# Patient Record
Sex: MALE | Race: WHITE | NOT HISPANIC OR LATINO | Employment: OTHER | URBAN - METROPOLITAN AREA
[De-identification: names, ages, dates, MRNs, and addresses within clinical notes are randomized per-mention and may not be internally consistent; named-entity substitution may affect disease eponyms.]

---

## 2024-02-08 ENCOUNTER — OFFICE VISIT (OUTPATIENT)
Dept: CARDIOLOGY | Facility: CLINIC | Age: 64
End: 2024-02-08
Payer: COMMERCIAL

## 2024-02-08 VITALS
BODY MASS INDEX: 34.59 KG/M2 | HEART RATE: 65 BPM | SYSTOLIC BLOOD PRESSURE: 130 MMHG | WEIGHT: 261 LBS | DIASTOLIC BLOOD PRESSURE: 82 MMHG | HEIGHT: 73 IN

## 2024-02-08 DIAGNOSIS — Z95.5 S/P RIGHT CORONARY ARTERY (RCA) STENT PLACEMENT: ICD-10-CM

## 2024-02-08 DIAGNOSIS — I25.10 CORONARY ARTERY DISEASE INVOLVING NATIVE CORONARY ARTERY OF NATIVE HEART WITHOUT ANGINA PECTORIS: Primary | ICD-10-CM

## 2024-02-08 DIAGNOSIS — R07.2 PRECORDIAL PAIN: ICD-10-CM

## 2024-02-08 DIAGNOSIS — I10 PRIMARY HYPERTENSION: ICD-10-CM

## 2024-02-08 DIAGNOSIS — E78.00 PURE HYPERCHOLESTEROLEMIA: ICD-10-CM

## 2024-02-08 PROCEDURE — 93000 ELECTROCARDIOGRAM COMPLETE: CPT | Performed by: INTERNAL MEDICINE

## 2024-02-08 PROCEDURE — 99204 OFFICE O/P NEW MOD 45 MIN: CPT | Performed by: INTERNAL MEDICINE

## 2024-02-08 RX ORDER — LISINOPRIL AND HYDROCHLOROTHIAZIDE 20; 12.5 MG/1; MG/1
1 TABLET ORAL DAILY
COMMUNITY
Start: 2023-11-08

## 2024-02-08 RX ORDER — METOPROLOL SUCCINATE 25 MG/1
12.5 TABLET, EXTENDED RELEASE ORAL DAILY
COMMUNITY
Start: 2023-10-19 | End: 2024-10-18

## 2024-02-08 RX ORDER — TESTOSTERONE CYPIONATE 200 MG/ML
200 INJECTION, SOLUTION INTRAMUSCULAR
COMMUNITY
Start: 2024-01-08

## 2024-02-08 RX ORDER — ASPIRIN 81 MG/1
81 TABLET ORAL DAILY
COMMUNITY

## 2024-02-08 RX ORDER — ALFUZOSIN HYDROCHLORIDE 10 MG/1
10 TABLET, EXTENDED RELEASE ORAL DAILY
COMMUNITY

## 2024-02-08 RX ORDER — ATORVASTATIN CALCIUM 40 MG/1
40 TABLET, FILM COATED ORAL NIGHTLY
Qty: 90 TABLET | Refills: 3 | Status: SHIPPED | COMMUNITY
Start: 2024-02-08 | End: 2024-02-12 | Stop reason: SDUPTHER

## 2024-02-08 RX ORDER — ATORVASTATIN CALCIUM 20 MG/1
20 TABLET, FILM COATED ORAL DAILY
COMMUNITY
End: 2024-02-08

## 2024-02-08 NOTE — PROGRESS NOTES
Date of Office Visit: 2024  Encounter Provider: Ángela Fontanez MD  Place of Service: [facility] CARDIOLOGY  Patient Name: Yossi Vee  :1960      Patient ID:  Yossi Vee is a 63 y.o. male is here for CAD.           History of Present Illness    He has a history of CAD-s/p RCA and circumflex stents , history of syncope, hypertension, hyperlipidemia, obesity.    He quit smoking .  His mom had hypertension, diabetes mellitus, colon and kidney cancer, CABG in her 60s.  His dad had strokes, hypertension and diabetes.  His brother and sister have hypertension.  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    He was having exertional chest heaviness.  He had treadmill stress study with ST changes in the inferior leads.  Cardiac catheterization done 2019 showed a 20% distal left main stenosis, 70% distal LAD stenosis, 80% proximal circumflex stenosis, 95% proximal RCA stenosis.  He received a 3 x 15 AMANDA in the proximal RCA, 3 x 15 AMANDA in the mid RCA, 2.5 x 18 AMANDA in the proximal left circumflex into a large second OM branch.    Labs on 10/17/2023 show normal CBC, normal CMP, total cholesterol 139, triglycerides 151, HDL 29, LDL 80, normal TSH.    He has had 2 weeks of sharp stabbing left chest pain.  He does not feels heart racing or skipping.  His energy level stable.  He has no dyspnea, orthopnea or PND.  He has had no syncope or falls.  He takes his medications as directed without difficulty.  His energy level is stable.  He has a lot of back injuries and has cramping in his legs likely due to that.    Past Medical History:   Diagnosis Date    CAD (coronary artery disease)     Cystitis cystica     DDD (degenerative disc disease), cervical     History of nephrectomy     History of vasectomy     HLD (hyperlipidemia)     HTN (hypertension)     Kidney stone     Low testosterone     S/P  gastrointestinal surgery     S/P tendon repair     HIP         Past Surgical History:   Procedure Laterality Date    APPENDECTOMY      CATARACT EXTRACTION      CERVICAL DISC ARTHROPLASTY      CORONARY STENT PLACEMENT      x3    LUMBAR FUSION      SINUS SURGERY      TONSILLECTOMY      URETHRA SURGERY         Current Outpatient Medications on File Prior to Visit   Medication Sig Dispense Refill    alfuzosin (UROXATRAL) 10 MG 24 hr tablet Take 1 tablet by mouth Daily.      aspirin 81 MG EC tablet Take 1 tablet by mouth Daily.      atorvastatin (LIPITOR) 20 MG tablet Take 1 tablet by mouth Daily.      lisinopril-hydrochlorothiazide (PRINZIDE,ZESTORETIC) 20-12.5 MG per tablet Take 1 tablet by mouth Daily.      metoprolol succinate XL (TOPROL-XL) 25 MG 24 hr tablet Take 0.5 tablets by mouth Daily.      Testosterone Cypionate (DEPOTESTOTERONE CYPIONATE) 200 MG/ML injection Inject 1 mL into the appropriate muscle as directed by prescriber.       No current facility-administered medications on file prior to visit.       Social History     Socioeconomic History    Marital status:     Number of children: 2    Years of education: 12    Highest education level: High school graduate   Tobacco Use    Smoking status: Former     Types: Cigarettes     Quit date:      Years since quittin.1     Passive exposure: Past    Smokeless tobacco: Never    Tobacco comments:     Caffeine: 2 coffee daily   Vaping Use    Vaping Use: Never used   Substance and Sexual Activity    Alcohol use: Not Currently    Drug use: Never    Sexual activity: Defer             Procedures    ECG 12 Lead    Date/Time: 2024 1:30 PM  Performed by: Ángela Fontanez MD    Authorized by: Ángela Fontanez MD  Comparison: not compared with previous ECG   Previous ECG: no previous ECG available  Rhythm: sinus rhythm  Other findings: left ventricular hypertrophy    Clinical impression: abnormal EKG              Objective:      Vitals:     "02/08/24 1320   BP: 130/82   Pulse: 65   Weight: 118 kg (261 lb)   Height: 185.4 cm (73\")     Body mass index is 34.43 kg/m².    Vitals reviewed.   Constitutional:       General: Not in acute distress.     Appearance: Not diaphoretic.   Neck:      Vascular: No carotid bruit or JVD.   Pulmonary:      Effort: Pulmonary effort is normal.      Breath sounds: Normal breath sounds.   Cardiovascular:      Normal rate. Regular rhythm.      No gallop.    Pulses:     Intact distal pulses.      Carotid: 2+ bilaterally.     Radial: 2+ bilaterally.     Dorsalis pedis: 2+ bilaterally.     Posterior tibial: 2+ bilaterally.  Edema:     Peripheral edema absent.   Neurological:      Cranial Nerves: No cranial nerve deficit.           Lab Review:       Assessment:      Diagnosis Plan   1. Coronary artery disease involving native coronary artery of native heart without angina pectoris  Adult Transthoracic Echo Complete W/ Cont if Necessary Per Protocol    Stress Test With Myocardial Perfusion One Day      2. Primary hypertension        3. Pure hypercholesterolemia        4. S/P right coronary artery (RCA) stent placement  Adult Transthoracic Echo Complete W/ Cont if Necessary Per Protocol    Stress Test With Myocardial Perfusion One Day      5. Precordial pain  Adult Transthoracic Echo Complete W/ Cont if Necessary Per Protocol    Stress Test With Myocardial Perfusion One Day        CAD with history of proximal-mid RCA stents, proximal left circumflex stent.  Now having intermittent left-sided chest pain, substrate syncope fusion site and echocardiogram.  Hypertension, well-controlled.  Continue metoprolol and lisinopril hydrochlorothiazide.  Hyperlipidemia, increase atorvastatin to 40 mg nightly.  Back injuries     Plan:       See me in September, set up stress and echocardiogram.  Recommended vascular screening.  Increase atorvastatin as noted above.  "

## 2024-02-12 RX ORDER — ATORVASTATIN CALCIUM 40 MG/1
40 TABLET, FILM COATED ORAL NIGHTLY
Qty: 90 TABLET | Refills: 3 | Status: SHIPPED | OUTPATIENT
Start: 2024-02-12

## 2024-03-19 ENCOUNTER — TELEPHONE (OUTPATIENT)
Dept: CARDIOLOGY | Facility: CLINIC | Age: 64
End: 2024-03-19
Payer: COMMERCIAL

## 2024-03-19 ENCOUNTER — HOSPITAL ENCOUNTER (OUTPATIENT)
Dept: CARDIOLOGY | Facility: HOSPITAL | Age: 64
Discharge: HOME | End: 2024-03-19
Admitting: INTERNAL MEDICINE

## 2024-03-19 VITALS
HEART RATE: 59 BPM | SYSTOLIC BLOOD PRESSURE: 123 MMHG | WEIGHT: 261 LBS | HEIGHT: 72 IN | DIASTOLIC BLOOD PRESSURE: 79 MMHG | BODY MASS INDEX: 35.35 KG/M2

## 2024-03-19 DIAGNOSIS — E78.00 PURE HYPERCHOLESTEROLEMIA: ICD-10-CM

## 2024-03-19 DIAGNOSIS — I10 PRIMARY HYPERTENSION: ICD-10-CM

## 2024-03-19 DIAGNOSIS — I25.10 CORONARY ARTERY DISEASE INVOLVING NATIVE CORONARY ARTERY OF NATIVE HEART WITHOUT ANGINA PECTORIS: ICD-10-CM

## 2024-03-19 LAB
BH CV ECHO MEAS - DIST AO DIAM: 1.44 CM
BH CV VAS BP LEFT ARM: NORMAL MMHG
BH CV VAS BP RIGHT ARM: NORMAL MMHG
BH CV VAS SCREENING CAROTID CCA LEFT: NORMAL CM/SEC
BH CV VAS SCREENING CAROTID CCA RIGHT: NORMAL CM/SEC
BH CV VAS SCREENING CAROTID ICA LEFT: NORMAL CM/SEC
BH CV VAS SCREENING CAROTID ICA RIGHT: NORMAL CM/SEC
BH CV XLRA MEAS - MID AO DIAM: 1.6 CM
BH CV XLRA MEAS - PAD LEFT ABI DP: 1.14
BH CV XLRA MEAS - PAD LEFT ABI PT: 1.17
BH CV XLRA MEAS - PAD LEFT ARM: 123 MMHG
BH CV XLRA MEAS - PAD LEFT LEG DP: 141 MMHG
BH CV XLRA MEAS - PAD LEFT LEG PT: 145 MMHG
BH CV XLRA MEAS - PAD RIGHT ABI DP: 1.09
BH CV XLRA MEAS - PAD RIGHT ABI PT: 1.21
BH CV XLRA MEAS - PAD RIGHT ARM: 120 MMHG
BH CV XLRA MEAS - PAD RIGHT LEG DP: 134 MMHG
BH CV XLRA MEAS - PAD RIGHT LEG PT: 149 MMHG
BH CV XLRA MEAS - PROX AO DIAM: 1.73 CM
BH CV XLRA MEAS LEFT ICA/CCA RATIO: 1.03
BH CV XLRA MEAS LEFT PROX ECA PSV: NORMAL CM/SEC
BH CV XLRA MEAS RIGHT ICA/CCA RATIO: 1.16
BH CV XLRA MEAS RIGHT PROX ECA PSV: NORMAL CM/SEC
MAXIMAL PREDICTED HEART RATE: 156 BPM
STRESS TARGET HR: 133 BPM

## 2024-03-19 PROCEDURE — 93799 UNLISTED CV SVC/PROCEDURE: CPT

## 2024-03-19 NOTE — TELEPHONE ENCOUNTER
I spoke with Yossi Vee and updated pt on results from provider.  They verbalized understanding and have no further questions at this time.    Thank you,    Celi GASPAR, RN  [place] [facility]  03/19/24 15:53 EDT

## 2024-05-13 ENCOUNTER — TELEPHONE (OUTPATIENT)
Dept: CARDIOLOGY | Facility: CLINIC | Age: 64
End: 2024-05-13
Payer: COMMERCIAL

## 2024-05-14 ENCOUNTER — TELEPHONE (OUTPATIENT)
Dept: CARDIOLOGY | Facility: CLINIC | Age: 64
End: 2024-05-14

## 2024-05-14 ENCOUNTER — HOSPITAL ENCOUNTER (OUTPATIENT)
Dept: CARDIOLOGY | Facility: HOSPITAL | Age: 64
Discharge: HOME | End: 2024-05-14
Payer: COMMERCIAL

## 2024-05-14 VITALS
HEIGHT: 72 IN | DIASTOLIC BLOOD PRESSURE: 85 MMHG | WEIGHT: 261 LBS | SYSTOLIC BLOOD PRESSURE: 131 MMHG | BODY MASS INDEX: 35.35 KG/M2 | HEART RATE: 52 BPM

## 2024-05-14 VITALS — BODY MASS INDEX: 51.07 KG/M2 | HEIGHT: 60 IN | WEIGHT: 260.14 LBS

## 2024-05-14 DIAGNOSIS — R07.2 PRECORDIAL PAIN: ICD-10-CM

## 2024-05-14 DIAGNOSIS — I25.10 CORONARY ARTERY DISEASE INVOLVING NATIVE CORONARY ARTERY OF NATIVE HEART WITHOUT ANGINA PECTORIS: ICD-10-CM

## 2024-05-14 DIAGNOSIS — Z95.5 S/P RIGHT CORONARY ARTERY (RCA) STENT PLACEMENT: ICD-10-CM

## 2024-05-14 LAB
AORTIC ARCH: 2.8 CM
ASCENDING AORTA: 3.6 CM
BH CV ECHO MEAS - ACS: 2.05 CM
BH CV ECHO MEAS - AO MAX PG: 9.5 MMHG
BH CV ECHO MEAS - AO MEAN PG: 5 MMHG
BH CV ECHO MEAS - AO ROOT DIAM: 3.8 CM
BH CV ECHO MEAS - AO V2 MAX: 154 CM/SEC
BH CV ECHO MEAS - AO V2 VTI: 35.8 CM
BH CV ECHO MEAS - AVA(I,D): 2.19 CM2
BH CV ECHO MEAS - EDV(CUBED): 140.6 ML
BH CV ECHO MEAS - EDV(MOD-SP2): 84 ML
BH CV ECHO MEAS - EDV(MOD-SP4): 147 ML
BH CV ECHO MEAS - EF(MOD-BP): 59 %
BH CV ECHO MEAS - EF(MOD-SP2): 58.3 %
BH CV ECHO MEAS - EF(MOD-SP4): 59.2 %
BH CV ECHO MEAS - ESV(CUBED): 51.7 ML
BH CV ECHO MEAS - ESV(MOD-SP2): 35 ML
BH CV ECHO MEAS - ESV(MOD-SP4): 60 ML
BH CV ECHO MEAS - FS: 28.4 %
BH CV ECHO MEAS - IVS/LVPW: 1 CM
BH CV ECHO MEAS - IVSD: 0.9 CM
BH CV ECHO MEAS - LAT PEAK E' VEL: 10.6 CM/SEC
BH CV ECHO MEAS - LV DIASTOLIC VOL/BSA (35-75): 61.6 CM2
BH CV ECHO MEAS - LV MASS(C)D: 169 GRAMS
BH CV ECHO MEAS - LV MAX PG: 4.5 MMHG
BH CV ECHO MEAS - LV MEAN PG: 2 MMHG
BH CV ECHO MEAS - LV SYSTOLIC VOL/BSA (12-30): 25.2 CM2
BH CV ECHO MEAS - LV V1 MAX: 106 CM/SEC
BH CV ECHO MEAS - LV V1 VTI: 22.5 CM
BH CV ECHO MEAS - LVIDD: 5.2 CM
BH CV ECHO MEAS - LVIDS: 3.7 CM
BH CV ECHO MEAS - LVOT AREA: 3.5 CM2
BH CV ECHO MEAS - LVOT DIAM: 2.11 CM
BH CV ECHO MEAS - LVPWD: 0.9 CM
BH CV ECHO MEAS - MED PEAK E' VEL: 10.1 CM/SEC
BH CV ECHO MEAS - MR MAX PG: 41.7 MMHG
BH CV ECHO MEAS - MR MAX VEL: 323.1 CM/SEC
BH CV ECHO MEAS - MV A DUR: 0.13 SEC
BH CV ECHO MEAS - MV A MAX VEL: 73.7 CM/SEC
BH CV ECHO MEAS - MV DEC SLOPE: 263.4 CM/SEC2
BH CV ECHO MEAS - MV DEC TIME: 0.17 SEC
BH CV ECHO MEAS - MV E MAX VEL: 85.3 CM/SEC
BH CV ECHO MEAS - MV E/A: 1.16
BH CV ECHO MEAS - MV MAX PG: 2.18 MMHG
BH CV ECHO MEAS - MV MEAN PG: 0.66 MMHG
BH CV ECHO MEAS - MV P1/2T: 82.1 MSEC
BH CV ECHO MEAS - MV V2 VTI: 31.7 CM
BH CV ECHO MEAS - MVA(P1/2T): 2.7 CM2
BH CV ECHO MEAS - MVA(VTI): 2.47 CM2
BH CV ECHO MEAS - PA ACC TIME: 0.14 SEC
BH CV ECHO MEAS - PA V2 MAX: 103.9 CM/SEC
BH CV ECHO MEAS - PI END-D VEL: 119.5 CM/SEC
BH CV ECHO MEAS - PULM A REVS DUR: 0.11 SEC
BH CV ECHO MEAS - PULM A REVS VEL: 25.2 CM/SEC
BH CV ECHO MEAS - PULM DIAS VEL: 39 CM/SEC
BH CV ECHO MEAS - PULM S/D: 1.39
BH CV ECHO MEAS - PULM SYS VEL: 54.2 CM/SEC
BH CV ECHO MEAS - QP/QS: 0.8
BH CV ECHO MEAS - RAP SYSTOLE: 8 MMHG
BH CV ECHO MEAS - RV MAX PG: 2.1 MMHG
BH CV ECHO MEAS - RV V1 MAX: 72.4 CM/SEC
BH CV ECHO MEAS - RV V1 VTI: 15.4 CM
BH CV ECHO MEAS - RVOT DIAM: 2.28 CM
BH CV ECHO MEAS - RVSP: 13 MMHG
BH CV ECHO MEAS - SUP REN AO DIAM: 2.3 CM
BH CV ECHO MEAS - SV(LVOT): 78.5 ML
BH CV ECHO MEAS - SV(MOD-SP2): 49 ML
BH CV ECHO MEAS - SV(MOD-SP4): 87 ML
BH CV ECHO MEAS - SV(RVOT): 62.7 ML
BH CV ECHO MEAS - SVI(LVOT): 32.9 ML/M2
BH CV ECHO MEAS - SVI(MOD-SP2): 20.5 ML/M2
BH CV ECHO MEAS - SVI(MOD-SP4): 36.5 ML/M2
BH CV ECHO MEAS - TAPSE (>1.6): 2.28 CM
BH CV ECHO MEAS - TR MAX PG: 4.8 MMHG
BH CV ECHO MEAS - TR MAX VEL: 110 CM/SEC
BH CV ECHO MEASUREMENTS AVERAGE E/E' RATIO: 8.24
BH CV NUCLEAR PRIOR STUDY: 2
BH CV REST NUCLEAR ISOTOPE DOSE: 11.8 MCI
BH CV STRESS BP STAGE 1: NORMAL
BH CV STRESS BP STAGE 2: NORMAL
BH CV STRESS DURATION MIN STAGE 1: 3
BH CV STRESS DURATION MIN STAGE 2: 3
BH CV STRESS DURATION MIN STAGE 3: 1
BH CV STRESS DURATION SEC STAGE 1: 0
BH CV STRESS DURATION SEC STAGE 2: 0
BH CV STRESS DURATION SEC STAGE 3: 30
BH CV STRESS GRADE STAGE 1: 10
BH CV STRESS GRADE STAGE 2: 12
BH CV STRESS GRADE STAGE 3: 14
BH CV STRESS HR STAGE 1: 95
BH CV STRESS HR STAGE 2: 115
BH CV STRESS HR STAGE 3: 143
BH CV STRESS METS STAGE 1: 5
BH CV STRESS METS STAGE 2: 7.5
BH CV STRESS METS STAGE 3: 9
BH CV STRESS NUCLEAR ISOTOPE DOSE: 35.5 MCI
BH CV STRESS PROTOCOL 1: NORMAL
BH CV STRESS RECOVERY BP: NORMAL MMHG
BH CV STRESS RECOVERY HR: 82 BPM
BH CV STRESS SPEED STAGE 1: 1.7
BH CV STRESS SPEED STAGE 2: 2.5
BH CV STRESS SPEED STAGE 3: 3.4
BH CV STRESS STAGE 1: 1
BH CV STRESS STAGE 2: 2
BH CV STRESS STAGE 3: 3
BH CV XLRA - RV BASE: 3.3 CM
BH CV XLRA - RV LENGTH: 9.3 CM
BH CV XLRA - RV MID: 3.1 CM
BH CV XLRA - TDI S': 13.6 CM/SEC
LEFT ATRIUM VOLUME INDEX: 33.4 ML/M2
LV EF NUC BP: 51 %
MAXIMAL PREDICTED HEART RATE: 155 BPM
PERCENT MAX PREDICTED HR: 92.26 %
SINUS: 3.8 CM
STJ: 2.7 CM
STRESS BASELINE BP: NORMAL MMHG
STRESS BASELINE HR: 60 BPM
STRESS PERCENT HR: 109 %
STRESS POST ESTIMATED WORKLOAD: 9 METS
STRESS POST EXERCISE DUR MIN: 7 MIN
STRESS POST EXERCISE DUR SEC: 30 SEC
STRESS POST PEAK BP: NORMAL MMHG
STRESS POST PEAK HR: 143 BPM
STRESS TARGET HR: 132 BPM

## 2024-05-14 PROCEDURE — 78452 HT MUSCLE IMAGE SPECT MULT: CPT

## 2024-05-14 PROCEDURE — 78452 HT MUSCLE IMAGE SPECT MULT: CPT | Performed by: INTERNAL MEDICINE

## 2024-05-14 PROCEDURE — 93018 CV STRESS TEST I&R ONLY: CPT | Performed by: INTERNAL MEDICINE

## 2024-05-14 PROCEDURE — 93016 CV STRESS TEST SUPVJ ONLY: CPT | Performed by: INTERNAL MEDICINE

## 2024-05-14 PROCEDURE — 93017 CV STRESS TEST TRACING ONLY: CPT

## 2024-05-14 PROCEDURE — A9502 TC99M TETROFOSMIN: HCPCS | Performed by: INTERNAL MEDICINE

## 2024-05-14 PROCEDURE — 0 TECHNETIUM TETROFOSMIN KIT: Performed by: INTERNAL MEDICINE

## 2024-05-14 PROCEDURE — 93306 TTE W/DOPPLER COMPLETE: CPT | Performed by: INTERNAL MEDICINE

## 2024-05-14 PROCEDURE — 93306 TTE W/DOPPLER COMPLETE: CPT

## 2024-05-14 RX ADMIN — TETROFOSMIN 1 DOSE: 1.38 INJECTION, POWDER, LYOPHILIZED, FOR SOLUTION INTRAVENOUS at 12:15

## 2024-05-14 RX ADMIN — TETROFOSMIN 1 DOSE: 1.38 INJECTION, POWDER, LYOPHILIZED, FOR SOLUTION INTRAVENOUS at 11:02

## 2024-05-14 NOTE — TELEPHONE ENCOUNTER
Notified patient of results. Patient verbalized understanding.    Aracely Lau RN  Triage [facility] [place]

## 2024-05-14 NOTE — TELEPHONE ENCOUNTER
Pt called back in to triage.  Results reviewed with pt.  Instructed to call with any further questions or concerns.  Verbalized understanding.    Fouzia Dove RN  Triage Nurse, [facility] [place]  05/14/24 13:16 EDT

## 2024-05-14 NOTE — TELEPHONE ENCOUNTER
Called and left VM. Will continue to try to reach patient. HUB transfer call to triage.     Aracely Lau RN  [place] [facility]

## 2024-09-05 ENCOUNTER — OFFICE VISIT (OUTPATIENT)
Dept: CARDIOLOGY | Facility: CLINIC | Age: 64
End: 2024-09-05
Payer: MEDICARE

## 2024-09-05 VITALS
HEIGHT: 72 IN | SYSTOLIC BLOOD PRESSURE: 178 MMHG | BODY MASS INDEX: 36.03 KG/M2 | HEART RATE: 56 BPM | WEIGHT: 266 LBS | DIASTOLIC BLOOD PRESSURE: 100 MMHG

## 2024-09-05 DIAGNOSIS — E78.00 PURE HYPERCHOLESTEROLEMIA: ICD-10-CM

## 2024-09-05 DIAGNOSIS — Z95.5 S/P RIGHT CORONARY ARTERY (RCA) STENT PLACEMENT: ICD-10-CM

## 2024-09-05 DIAGNOSIS — I25.10 CORONARY ARTERY DISEASE INVOLVING NATIVE CORONARY ARTERY OF NATIVE HEART WITHOUT ANGINA PECTORIS: Primary | ICD-10-CM

## 2024-09-05 DIAGNOSIS — I10 PRIMARY HYPERTENSION: ICD-10-CM

## 2024-09-05 PROCEDURE — 1160F RVW MEDS BY RX/DR IN RCRD: CPT | Performed by: INTERNAL MEDICINE

## 2024-09-05 PROCEDURE — 93000 ELECTROCARDIOGRAM COMPLETE: CPT | Performed by: INTERNAL MEDICINE

## 2024-09-05 PROCEDURE — 3080F DIAST BP >= 90 MM HG: CPT | Performed by: INTERNAL MEDICINE

## 2024-09-05 PROCEDURE — 99214 OFFICE O/P EST MOD 30 MIN: CPT | Performed by: INTERNAL MEDICINE

## 2024-09-05 PROCEDURE — 1159F MED LIST DOCD IN RCRD: CPT | Performed by: INTERNAL MEDICINE

## 2024-09-05 PROCEDURE — 3077F SYST BP >= 140 MM HG: CPT | Performed by: INTERNAL MEDICINE

## 2024-09-05 RX ORDER — AMLODIPINE BESYLATE 5 MG/1
5 TABLET ORAL NIGHTLY
Qty: 90 TABLET | Refills: 3 | Status: SHIPPED | OUTPATIENT
Start: 2024-09-05

## 2024-09-05 RX ORDER — MAGNESIUM OXIDE 400 MG/1
400 TABLET ORAL DAILY
Qty: 90 TABLET | Refills: 3 | Status: SHIPPED | OUTPATIENT
Start: 2024-09-05

## 2024-09-05 RX ORDER — LISINOPRIL 20 MG/1
20 TABLET ORAL DAILY
Qty: 90 TABLET | Refills: 3 | Status: SHIPPED | OUTPATIENT
Start: 2024-09-05

## 2024-09-05 NOTE — PROGRESS NOTES
Date of Office Visit: 2024  Encounter Provider: Ángela Fontanez MD  Place of Service: [facility] CARDIOLOGY  Patient Name: Yossi Vee  :1960      Patient ID:  Yossi Vee is a 64 y.o. male is here for CAD.           History of Present Illness    He has a history of CAD-s/p RCA and circumflex stents , history of syncope, hypertension, hyperlipidemia, obesity.    He quit smoking .  His mom had hypertension, diabetes mellitus, colon and kidney cancer, CABG in her 60s.  His dad had strokes, hypertension and diabetes.  His brother and sister have hypertension.  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    He was having exertional chest heaviness.  He had treadmill stress study with ST changes in the inferior leads.  Cardiac catheterization done 2019 showed a 20% distal left main stenosis, 70% distal LAD stenosis, 80% proximal circumflex stenosis, 95% proximal RCA stenosis.  He received a 3 x 15 AMANDA in the proximal RCA, 3 x 15 AMANDA in the mid RCA, 2.5 x 18 AMANDA in the proximal left circumflex into a large second OM branch.    He had vascular screening done 3/19/2024 which showed carotid plaquing but no stenosis, otherwise normal vascular screening.  He had a nonischemic stress nuclear perfusion study on 2024.  He had an echocardiogram done 2024 showing ejection action 59% with mild left atrial enlargement, aortic root 3.8 cm, normal RVSP, normal diastolic function.    He is getting muscle cramping.  He works very hard outside and sweats a lot.  He is drinking only water and almost no pop.  His breathing is good.  He does not feels heart racing or skipping and has had no dizziness or syncope.  He has no exertional chest sinus or pressure.  He has no exertional dyspnea.  He has no orthopnea or PND.    Past Medical History:   Diagnosis Date    CAD (coronary artery disease)      Cystitis cystica     DDD (degenerative disc disease), cervical     History of nephrectomy     History of vasectomy     HLD (hyperlipidemia)     HTN (hypertension)     Kidney stone     Low testosterone     S/P gastrointestinal surgery     S/P tendon repair     HIP         Past Surgical History:   Procedure Laterality Date    APPENDECTOMY      CATARACT EXTRACTION      CERVICAL DISC ARTHROPLASTY      CORONARY STENT PLACEMENT      x3    LUMBAR FUSION      SINUS SURGERY      TONSILLECTOMY      URETHRA SURGERY         Current Outpatient Medications on File Prior to Visit   Medication Sig Dispense Refill    alfuzosin (UROXATRAL) 10 MG 24 hr tablet Take 1 tablet by mouth Daily.      aspirin 81 MG EC tablet Take 1 tablet by mouth Daily.      atorvastatin (LIPITOR) 40 MG tablet Take 1 tablet by mouth Every Night. 90 tablet 3    Cholecalciferol 125 MCG (5000 UT) tablet       lisinopril-hydrochlorothiazide (PRINZIDE,ZESTORETIC) 20-12.5 MG per tablet Take 1 tablet by mouth Daily.      metoprolol succinate XL (TOPROL-XL) 25 MG 24 hr tablet Take 0.5 tablets by mouth Daily.      Testosterone Cypionate (DEPOTESTOTERONE CYPIONATE) 200 MG/ML injection Inject 1 mL into the appropriate muscle as directed by prescriber.       No current facility-administered medications on file prior to visit.       Social History     Socioeconomic History    Marital status:     Number of children: 2    Years of education: 12    Highest education level: High school graduate   Tobacco Use    Smoking status: Former     Current packs/day: 0.00     Types: Cigarettes     Quit date:      Years since quittin.7     Passive exposure: Past    Smokeless tobacco: Never    Tobacco comments:     Caffeine: 2 coffee daily   Vaping Use    Vaping status: Never Used   Substance and Sexual Activity    Alcohol use: Not Currently    Drug use: Never    Sexual activity: Defer             Procedures    ECG 12 Lead    Date/Time: 2024 1:18 PM  Performed by:  "Ángela Fontanez MD    Authorized by: Ángela Fontanez MD  Comparison: compared with previous ECG   Similar to previous ECG  Rhythm: sinus rhythm  QRS axis: left    Clinical impression: abnormal EKG            Objective:      Vitals:    09/05/24 1259   BP: 178/100   Pulse: 56   Weight: 121 kg (266 lb)   Height: 182.9 cm (72\")     Body mass index is 36.08 kg/m².    Vitals reviewed.   Constitutional:       General: Not in acute distress.     Appearance: Not diaphoretic.   Neck:      Vascular: No carotid bruit or JVD.   Pulmonary:      Effort: Pulmonary effort is normal.      Breath sounds: Normal breath sounds.   Cardiovascular:      Normal rate. Regular rhythm.      No gallop.    Pulses:     Intact distal pulses.      Carotid: 2+ bilaterally.     Radial: 2+ bilaterally.     Dorsalis pedis: 2+ bilaterally.     Posterior tibial: 2+ bilaterally.  Edema:     Peripheral edema absent.   Neurological:      Cranial Nerves: No cranial nerve deficit.         Lab Review:       Assessment:      Diagnosis Plan   1. Coronary artery disease involving native coronary artery of native heart without angina pectoris        2. S/P right coronary artery (RCA) stent placement        3. Primary hypertension        4. Pure hypercholesterolemia          CAD with history of proximal-mid RCA stents, proximal left circumflex stent.  Now having intermittent left-sided chest pain, substrate syncope fusion site and echocardiogram.  Hypertension, BP high today.  Continue metoprolol.  Medication changes as noted below.  Hyperlipidemia, on atorvastatin 40 mg nightly.  Back injuries     Plan:       See Edith in 6 months.  I think he is getting a lot of muscle cramping due to electrolyte abnormalities and relative dehydration.  Start magnesium oxide 400 mg daily, stop HCTZ.  Restart lisinopril 20 mg daily and start amlodipine 5 mg at night for hypertension.  No other testing at this time.  Will get labs from PCP.  "

## 2024-12-05 RX ORDER — METOPROLOL SUCCINATE 25 MG/1
12.5 TABLET, EXTENDED RELEASE ORAL DAILY
Qty: 45 TABLET | Refills: 3 | Status: SHIPPED | OUTPATIENT
Start: 2024-12-05

## 2024-12-05 NOTE — TELEPHONE ENCOUNTER
Patient is requesting a refill on Metoprolol    NOV-03/12/25-EE  LOV-09/05/24-RM    CAD with history of proximal-mid RCA stents, proximal left circumflex stent.  Now having intermittent left-sided chest pain, substrate syncope fusion site and echocardiogram.  Hypertension, BP high today.  Continue metoprolol.  Medication changes as noted below.  Hyperlipidemia, on atorvastatin 40 mg nightly.  Back injuries     Plan:       See Edith in 6 months.  I think he is getting a lot of muscle cramping due to electrolyte abnormalities and relative dehydration.  Start magnesium oxide 400 mg daily, stop HCTZ.  Restart lisinopril 20 mg daily and start amlodipine 5 mg at night for hypertension.  No other testing at this time.  Will get labs from PCP.

## 2024-12-20 RX ORDER — ATORVASTATIN CALCIUM 40 MG/1
40 TABLET, FILM COATED ORAL NIGHTLY
Qty: 90 TABLET | Refills: 3 | Status: SHIPPED | OUTPATIENT
Start: 2024-12-20

## 2024-12-20 NOTE — TELEPHONE ENCOUNTER
Failed protocol    Nov-03/12/25-EE  LOV-09/05/24-RM    Plan:       See Edith in 6 months.  I think he is getting a lot of muscle cramping due to electrolyte abnormalities and relative dehydration.  Start magnesium oxide 400 mg daily, stop HCTZ.  Restart lisinopril 20 mg daily and start amlodipine 5 mg at night for hypertension.  No other testing at this time.  Will get labs from PCP.

## 2025-02-06 ENCOUNTER — OFFICE VISIT (OUTPATIENT)
Dept: NEUROSURGERY | Facility: CLINIC | Age: 65
End: 2025-02-06
Payer: MEDICARE

## 2025-02-06 ENCOUNTER — TELEPHONE (OUTPATIENT)
Dept: NEUROSURGERY | Facility: CLINIC | Age: 65
End: 2025-02-06

## 2025-02-06 VITALS
SYSTOLIC BLOOD PRESSURE: 120 MMHG | TEMPERATURE: 97.3 F | HEIGHT: 73 IN | DIASTOLIC BLOOD PRESSURE: 80 MMHG | BODY MASS INDEX: 34.87 KG/M2 | WEIGHT: 263.1 LBS

## 2025-02-06 DIAGNOSIS — I73.00 RAYNAUD'S PHENOMENON WITHOUT GANGRENE: ICD-10-CM

## 2025-02-06 DIAGNOSIS — M47.812 CERVICAL SPONDYLOSIS WITHOUT MYELOPATHY: Primary | ICD-10-CM

## 2025-02-06 DIAGNOSIS — M50.121 CERVICAL DISC DISORDER AT C4-C5 LEVEL WITH RADICULOPATHY: ICD-10-CM

## 2025-02-06 PROCEDURE — 1160F RVW MEDS BY RX/DR IN RCRD: CPT | Performed by: PHYSICIAN ASSISTANT

## 2025-02-06 PROCEDURE — 99204 OFFICE O/P NEW MOD 45 MIN: CPT | Performed by: PHYSICIAN ASSISTANT

## 2025-02-06 PROCEDURE — 1159F MED LIST DOCD IN RCRD: CPT | Performed by: PHYSICIAN ASSISTANT

## 2025-02-06 PROCEDURE — 3074F SYST BP LT 130 MM HG: CPT | Performed by: PHYSICIAN ASSISTANT

## 2025-02-06 PROCEDURE — 3079F DIAST BP 80-89 MM HG: CPT | Performed by: PHYSICIAN ASSISTANT

## 2025-02-06 NOTE — PROGRESS NOTES
Patient: Yossi Vee  : 1960  Chart #: [id number]    Date of Service: 2025    CC: Neck left shoulder left arm pain      HPI  Mr. Vee is a 64-year-old gentleman who previously had a two-level artificial disc placed at C5-6 and C6-7 in 2018 in [facility].  The neurosurgeon there also did a lumbar L4-5 fusion.  He has since moved to [place] and lives in [place] farm.  He presents to our office with left-sided neck pain radiating into the top of the shoulder and then down the arm usually just to the elbow.  He has had symptoms for over a year however his pain is gotten worse and he has weakness in his deltoids and has difficulty raising his left arm above shoulder level.  He was sent for x-rays of his cervical spine showing his interbody prosthesis at C5-6 and C6-7 and x-rays of his left shoulder showing mild glenohumeral joint degenerative changes.    Chronic Illnesses:    Past Medical History:   Diagnosis Date    CAD (coronary artery disease)     Cystitis cystica     DDD (degenerative disc disease), cervical     History of nephrectomy     History of vasectomy     HLD (hyperlipidemia)     HTN (hypertension)     Kidney stone     Low testosterone     S/P gastrointestinal surgery     S/P tendon repair     HIP         Past Surgical History:   Procedure Laterality Date    APPENDECTOMY      CATARACT EXTRACTION      CERVICAL DISC ARTHROPLASTY      CORONARY STENT PLACEMENT      x3    LUMBAR FUSION      SINUS SURGERY      TONSILLECTOMY      URETHRA SURGERY         Allergies   Allergen Reactions    Codeine Nausea And Vomiting         Current Outpatient Medications:     alfuzosin (UROXATRAL) 10 MG 24 hr tablet, Take 1 tablet by mouth Daily., Disp: , Rfl:     amLODIPine (NORVASC) 5 MG tablet, Take 1 tablet by mouth Every Night., Disp: 90 tablet, Rfl: 3    aspirin 81 MG EC tablet, Take 1 tablet by mouth Daily., Disp: , Rfl:     atorvastatin (LIPITOR) 40 MG tablet, TAKE 1 TABLET BY MOUTH  EVERY NIGHT, Disp: 90 tablet, Rfl: 3    lisinopril (PRINIVIL,ZESTRIL) 20 MG tablet, Take 1 tablet by mouth Daily., Disp: 90 tablet, Rfl: 3    metoprolol succinate XL (TOPROL-XL) 25 MG 24 hr tablet, Take 0.5 tablets by mouth Daily., Disp: 45 tablet, Rfl: 3    Testosterone Cypionate (DEPOTESTOTERONE CYPIONATE) 200 MG/ML injection, Inject 1 mL into the appropriate muscle as directed by prescriber., Disp: , Rfl:     vitamin D3 125 MCG (5000 UT) capsule capsule, Take 1 capsule by mouth Daily., Disp: , Rfl:     magnesium oxide (MAG-OX) 400 MG tablet, Take 1 tablet by mouth Daily., Disp: 90 tablet, Rfl: 3    metFORMIN (GLUCOPHAGE) 500 MG tablet, TAKE 1 TABLET BY MOUTH AT NOON, Disp: , Rfl:     Social History     Socioeconomic History    Marital status:     Number of children: 2    Years of education: 12    Highest education level: High school graduate   Tobacco Use    Smoking status: Former     Current packs/day: 0.00     Types: Cigarettes     Quit date:      Years since quittin.1     Passive exposure: Past    Smokeless tobacco: Never    Tobacco comments:     Caffeine: 2 coffee daily   Vaping Use    Vaping status: Never Used   Substance and Sexual Activity    Alcohol use: Not Currently    Drug use: Never    Sexual activity: Defer       Family History   Problem Relation Age of Onset    Diabetes Mother     Hypertension Mother     Heart failure Mother     Heart disease Mother     Colon cancer Mother     Diabetes Father     Hypertension Father     Stroke Father     Hypertension Sister     Hypertension Brother     Diabetes Brother        Class 2 Severe Obesity (BMI >=35 and <=39.9). Obesity-related health conditions include the following: osteoarthritis. Obesity is improving with lifestyle modifications. BMI is is above average; BMI management plan is completed.     Social History    Tobacco Use      Smoking status: Former        Packs/day: 0.00        Types: Cigarettes        Quit date:         Years since  "quittin.1        Passive exposure: Past      Smokeless tobacco: Never      Tobacco comments: Caffeine: 2 coffee daily       Review of Systems   Patient reports he had frostbite of the toes on his right foot 3 to 4 weeks ago with the big snow and ice.  He has a long history of his feet being cold.  Long history of pinpricks to the feet.  He has noticed his skin discoloration of red, white and blue.    Gait & Balance Assessment:  Risk assessment for falls. Fall precautions:  such as;   Using gait aids a cane, walker at the appropriate height at all times for ambulation or if necessary a wheelchair  Removing all area rugs and coffee tables to create a safe environment at home  Ensure clean, dry floors  Wearing supportive footwear and properly fitting clothing  Ensure bed/chair is appropriate height and patient's feet can touch the floor  Using a shower transfer bench  Using walk-in shower and having shower safety bars installed  Ensure proper lighting, minimize glare  Have nightlights operational and in use  Participation in an exercise program for gait training, balance training and strength  Avoid carrying laundry up and down steps  Ensure proper compliance and organization of medications to avoid errors   Avoid use of over the counter sedatives and alcohol consumption  Ensure easy access to call bell, glasses, TV control, telephone  Ensure glasses/hearing aids are in use or close by (on top of night table)     Physical examination:  Blood pressure 120/80, temperature 97.3 °F (36.3 °C), temperature source Infrared, height 185.4 cm (73\"), weight 119 kg (263 lb 1.6 oz).  HEENT- normocephalic, atraumatic, sclera clear  Lungs-normal expansion, no wheezing  Heart-regular rate and rhythm  Extremities-positive pulses, no edema in the upper and lower extremities.  He has color changes in the toes, red-white and purple.  His feet are cold to the touch.    Neurological Exam   WDWN WM  A/A/C, speech clear, attention " normal, conversant, answers questions appropriately, good historian.  Cranial nerves II through XII are intact.  Motor examination does not reveal weakness in the . upper extremity examination is 4+/5.  Mild quad weakness in the left leg.  Sensation feels like sharp tingling pain in the medial aspect of the right foot.  Gait is normal, balance is normal.   No tremors are noted.  Reflexes are intact in the lower extremities.  Hard to elicit in the upper extremities  Mann is negative. Clonus is negative.   Palpation of the left shoulder is tender, there is glenohumeral tenderness.  He has painful range of motion of the left shoulder.    Radiographic Imaging:  For my review x-rays of the cervical spine which show interbody devices at C5-6 and C6-7.    Medical Decision Making  Diagnoses and all orders for this visit:    1. Cervical spondylosis without myelopathy (Primary)  -     MRI Cervical Spine With & Without Contrast; Future  -     XR spine cervical ap and lat w flex and ext; Future  -     CT Cervical Spine Without Contrast; Future  -     EMG & Nerve Conduction Test; Future    2. Cervical disc disorder at C4-C5 level with radiculopathy  -     MRI Cervical Spine With & Without Contrast; Future  -     XR spine cervical ap and lat w flex and ext; Future  -     CT Cervical Spine Without Contrast; Future  -     EMG & Nerve Conduction Test; Future    3. Raynaud's phenomenon without gangrene  -     EMG & Nerve Conduction Test; Future    Mr. Vee is a 64-year-old gentleman with prior lumbar fusion and two-level cervical fusion that was done in [facility].  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  He has been having left-sided neck shoulder and arm pain for several months and referred to neurosurgery for evaluation.  The patient requires new studies to evaluate for nerve root compression.   Patient gives symptoms associated with  Raynaud's into his feet and toes.  I would ask him to discuss this with his PCP regarding treatment.     Based upon the findings we will determine our plan of treatment.  The patient and his wife are in agreement with this plan.    Any copied data from previous notes included in the (1) HPI, (2) PE, (3) MDM and/or assessment and plan has been reviewed and is accurate as of this day.    Chanell Peres, REJI    Patient Care Team:  Trino Rodriguez DO as PCP - General (Family Medicine)

## 2025-03-26 ENCOUNTER — OFFICE VISIT (OUTPATIENT)
Dept: CARDIOLOGY | Age: 65
End: 2025-03-26
Payer: MEDICARE

## 2025-03-26 VITALS
HEART RATE: 53 BPM | BODY MASS INDEX: 35.39 KG/M2 | DIASTOLIC BLOOD PRESSURE: 70 MMHG | HEIGHT: 73 IN | OXYGEN SATURATION: 98 % | WEIGHT: 267 LBS | SYSTOLIC BLOOD PRESSURE: 125 MMHG

## 2025-03-26 DIAGNOSIS — E78.00 PURE HYPERCHOLESTEROLEMIA: ICD-10-CM

## 2025-03-26 DIAGNOSIS — I10 PRIMARY HYPERTENSION: ICD-10-CM

## 2025-03-26 DIAGNOSIS — I25.10 CORONARY ARTERY DISEASE INVOLVING NATIVE CORONARY ARTERY OF NATIVE HEART WITHOUT ANGINA PECTORIS: Primary | ICD-10-CM

## 2025-03-26 NOTE — PROGRESS NOTES
Date of Office Visit: 2025  Encounter Provider: LIZ Sharp  Place of Service: [facility] CARDIOLOGY  Established cardiologist: Ángela Fontanez MD  Patient Name: Yossi Vee  :1960      Patient ID:  Yossi Vee is a 64 y.o. male is here for  followup    With a pertinent medical history of CAD-s/p RCA and circumflex stents , history of syncope, hypertension, hyperlipidemia, obesity.     He quit smoking . His mom had hypertension, diabetes mellitus, colon and kidney cancer, CABG in her 60s. His dad had strokes, hypertension and diabetes. His brother and sister have hypertension. 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     History of Present Illness   he is experiencing exertional chest heaviness.  Treadmill stress with inferior ST segment abnormalities.  Cardiac catheterization 19 to 20% distal left main stenosis, 70% distal LAD stenosis, 80% proximal circumflex stenosis, 95% proximal RCA stenosis.  He received AMANDA to the proximal RCA, mid RCA, proximal circumflex.     Vascular screening 3/19/2024 with carotid plaque, but no stenosis otherwise normal.  Completed nonischemic stress nuclear study 2024.  An echocardiogram at this time an ejection fraction of 59%, sclerotic aortic valve, trace mitral regurgitation, trace tricuspid regurgitation borderline dilated aortic root measuring 3.8 cm.     He last saw Dr. Fontanez in the office 2024.  BP elevated.  He was having frequent muscle cramping and it was felt this was due to electrolyte abnormalities and dehydration and he was to start magnesium oxide 400 mg daily and stop HCTZ.  Lisinopril 20 mg daily was added as well as amlodipine 5 mg nightly.    3/21/2025 he completed WILLIAM measurements at [facility] which showed no evidence of significant arterial insufficiency in the lower extremities.  Bilateral  arterial lower extremity Dopplers showed no high-grade stenosis or occlusion.    Today Yossi is here with his wife.  He is feeling well.  He actually suffered frostbite this winter when he was out too long in the snow and had some damage to his right toes that he is following PCP for.  Otherwise he has no acute complaints or concerns today.  He enjoys going out and hunting.  His muscle cramping resolved with magnesium supplementation.  He has not had any chest pain or pressure, shortness of breath, PARADA, PND, presyncope/syncope, heart racing, palpitations, lightheadedness, dizziness, or edema.      Current Outpatient Medications on File Prior to Visit   Medication Sig Dispense Refill    alfuzosin (UROXATRAL) 10 MG 24 hr tablet Take 1 tablet by mouth Daily.      amLODIPine (NORVASC) 5 MG tablet Take 1 tablet by mouth Every Night. 90 tablet 3    aspirin 81 MG EC tablet Take 1 tablet by mouth Daily.      atorvastatin (LIPITOR) 40 MG tablet TAKE 1 TABLET BY MOUTH EVERY NIGHT 90 tablet 3    lisinopril (PRINIVIL,ZESTRIL) 20 MG tablet Take 1 tablet by mouth Daily. 90 tablet 3    magnesium oxide (MAG-OX) 400 MG tablet Take 1 tablet by mouth Daily. 90 tablet 3    metFORMIN (GLUCOPHAGE) 500 MG tablet TAKE 1 TABLET BY MOUTH AT NOON      metoprolol succinate XL (TOPROL-XL) 25 MG 24 hr tablet Take 0.5 tablets by mouth Daily. 45 tablet 3    Testosterone Cypionate (DEPOTESTOTERONE CYPIONATE) 200 MG/ML injection Inject 1 mL into the appropriate muscle as directed by prescriber.      vitamin D3 125 MCG (5000 UT) capsule capsule Take 1 capsule by mouth Daily.       No current facility-administered medications on file prior to visit.         Procedures    ECG 12 Lead    Date/Time: 3/26/2025 10:14 AM  Performed by: Edith Mccoy APRN    Authorized by: Edith Mccoy APRN  Comparison: compared with previous ECG from 2/8/2024  Comparison to previous ECG: Inferolateral ST-T abnormalities.  Rhythm: sinus rhythm  BPM: 53            "   Objective:      Vitals:    03/26/25 0948   BP: 125/70   Pulse: 53   SpO2: 98%   Weight: 121 kg (267 lb)   Height: 185.4 cm (73\")     Body mass index is 35.23 kg/m².  Wt Readings from Last 3 Encounters:   03/26/25 121 kg (267 lb)   02/06/25 119 kg (263 lb 1.6 oz)   09/05/24 121 kg (266 lb)         Constitutional:       Appearance: Healthy appearance. Not in distress.   Eyes:      Pupils: Pupils are equal, round, and reactive to light.   Neck:      Vascular: No JVD.   Pulmonary:      Effort: Pulmonary effort is normal.      Breath sounds: Normal breath sounds.   Cardiovascular:      Normal rate. Regular rhythm.      Murmurs: There is no murmur.   Pulses:     Popliteal: 2+ bilaterally.     Posterior tibial: 2+ bilaterally.  Edema:     Peripheral edema absent.   Musculoskeletal:      Cervical back: Normal range of motion. Skin:     General: Skin is warm and dry.   Neurological:      Mental Status: Alert, oriented to person, place, and time and oriented to person, place and time.   Psychiatric:         Speech: Speech normal.       Lab Review:   1/8/2025, labs done at [facility] unremarkable CBC.  Creatinine 1.10 otherwise unremarkable CMP.  Total cholesterol 120  HDL 29 LDL 70.  TSH 0.93    Assessment:     1. Coronary artery disease involving native coronary artery of native heart without angina pectoris    2. Primary hypertension    3. Pure hypercholesterolemia        CAD with history of proximal-mid RCA stents, proximal left circumflex stent.  Nonischemic stress 5/2024.  He has no angina.  He continues on ASA, statin, beta-blocker.   Hypertension, this is well-controlled on present regimen.  Amlodipine 5 mg, lisinopril 20 mg, metoprolol succinate XL 25 mg.  Hyperlipidemia, on atorvastatin 40 mg nightly.  Back injuries, has had various discectomies and spine procedures.     Plan:   No medication changes were made  He stable from a cardiac standpoint  He will see Dr. Fontanez in 1 year        Thank you for " allowing me to participate in this patient's care. Please call with any questions or concerns.          Dragon dictation device was utilized in this note.

## 2025-04-21 ENCOUNTER — HOSPITAL ENCOUNTER (OUTPATIENT)
Dept: MRI IMAGING | Facility: HOSPITAL | Age: 65
Discharge: HOME | End: 2025-04-21
Payer: MEDICARE

## 2025-04-21 ENCOUNTER — OFFICE VISIT (OUTPATIENT)
Dept: NEUROSURGERY | Facility: CLINIC | Age: 65
End: 2025-04-21
Payer: MEDICARE

## 2025-04-21 ENCOUNTER — HOSPITAL ENCOUNTER (OUTPATIENT)
Dept: GENERAL RADIOLOGY | Facility: HOSPITAL | Age: 65
Discharge: HOME | End: 2025-04-21
Payer: MEDICARE

## 2025-04-21 ENCOUNTER — HOSPITAL ENCOUNTER (OUTPATIENT)
Dept: NEUROLOGY | Facility: HOSPITAL | Age: 65
Discharge: HOME | End: 2025-04-21
Payer: MEDICARE

## 2025-04-21 ENCOUNTER — HOSPITAL ENCOUNTER (OUTPATIENT)
Dept: CT IMAGING | Facility: HOSPITAL | Age: 65
Discharge: HOME | End: 2025-04-21
Payer: MEDICARE

## 2025-04-21 VITALS — TEMPERATURE: 98.2 F | BODY MASS INDEX: 36.22 KG/M2 | HEIGHT: 72 IN | WEIGHT: 267.4 LBS

## 2025-04-21 DIAGNOSIS — R29.898 WEAKNESS OF LEFT SHOULDER: Primary | ICD-10-CM

## 2025-04-21 DIAGNOSIS — M50.121 CERVICAL DISC DISORDER AT C4-C5 LEVEL WITH RADICULOPATHY: ICD-10-CM

## 2025-04-21 DIAGNOSIS — M47.812 CERVICAL SPONDYLOSIS WITHOUT MYELOPATHY: ICD-10-CM

## 2025-04-21 DIAGNOSIS — M25.512 PAIN IN JOINT OF LEFT SHOULDER: ICD-10-CM

## 2025-04-21 DIAGNOSIS — I73.00 RAYNAUD'S PHENOMENON WITHOUT GANGRENE: ICD-10-CM

## 2025-04-21 PROCEDURE — A9577 INJ MULTIHANCE: HCPCS | Performed by: PHYSICIAN ASSISTANT

## 2025-04-21 PROCEDURE — 95886 MUSC TEST DONE W/N TEST COMP: CPT

## 2025-04-21 PROCEDURE — 72125 CT NECK SPINE W/O DYE: CPT

## 2025-04-21 PROCEDURE — 25510000002 GADOBENATE DIMEGLUMINE 529 MG/ML SOLUTION: Performed by: PHYSICIAN ASSISTANT

## 2025-04-21 PROCEDURE — 72050 X-RAY EXAM NECK SPINE 4/5VWS: CPT

## 2025-04-21 PROCEDURE — 1159F MED LIST DOCD IN RCRD: CPT | Performed by: PHYSICIAN ASSISTANT

## 2025-04-21 PROCEDURE — 72156 MRI NECK SPINE W/O & W/DYE: CPT

## 2025-04-21 PROCEDURE — 99214 OFFICE O/P EST MOD 30 MIN: CPT | Performed by: PHYSICIAN ASSISTANT

## 2025-04-21 PROCEDURE — 1160F RVW MEDS BY RX/DR IN RCRD: CPT | Performed by: PHYSICIAN ASSISTANT

## 2025-04-21 PROCEDURE — 95911 NRV CNDJ TEST 9-10 STUDIES: CPT

## 2025-04-21 RX ORDER — LISINOPRIL AND HYDROCHLOROTHIAZIDE 12.5; 2 MG/1; MG/1
1 TABLET ORAL DAILY
COMMUNITY
End: 2025-04-21

## 2025-04-21 RX ADMIN — GADOBENATE DIMEGLUMINE 20 ML: 529 INJECTION, SOLUTION INTRAVENOUS at 07:17

## 2025-04-21 NOTE — PROGRESS NOTES
Patient: Yossi Vee  : 1960  Chart #: [id number]    Date of Service: 2025    CC: Neck left shoulder left arm pain      HPI  Mr. Vee is a 64-year-old gentleman who previously had a two-level artificial disc placed at C5-6 and C6-7 in 2018 in [facility].  The neurosurgeon there also did a lumbar L4-5 fusion.  He has since moved to [place] and lives in [place] farm.  He presents to our office with left-sided neck pain radiating into the top of the shoulder and then down the arm usually just to the elbow.  He has had symptoms for over a year however his pain is gotten worse and he has weakness in his deltoids and has difficulty raising his left arm above shoulder level.  He was sent for x-rays of his cervical spine showing his interbody prosthesis at C5-6 and C6-7 and x-rays of his left shoulder showing mild glenohumeral joint degenerative changes.    Chronic Illnesses:    Past Medical History:   Diagnosis Date    CAD (coronary artery disease)     Cystitis cystica     DDD (degenerative disc disease), cervical     History of nephrectomy     History of vasectomy     HLD (hyperlipidemia)     HTN (hypertension)     Kidney stone     Low testosterone     S/P gastrointestinal surgery     S/P tendon repair     HIP         Past Surgical History:   Procedure Laterality Date    APPENDECTOMY      CATARACT EXTRACTION      CERVICAL DISC ARTHROPLASTY      CORONARY STENT PLACEMENT      x3    LUMBAR FUSION      SINUS SURGERY      TONSILLECTOMY      URETHRA SURGERY         Allergies   Allergen Reactions    Codeine Nausea And Vomiting         Current Outpatient Medications:     alfuzosin (UROXATRAL) 10 MG 24 hr tablet, Take 1 tablet by mouth Daily., Disp: , Rfl:     amLODIPine (NORVASC) 5 MG tablet, Take 1 tablet by mouth Every Night., Disp: 90 tablet, Rfl: 3    aspirin 81 MG EC tablet, Take 1 tablet by mouth Daily., Disp: , Rfl:     atorvastatin (LIPITOR) 40 MG tablet, TAKE 1 TABLET BY MOUTH  EVERY NIGHT, Disp: 90 tablet, Rfl: 3    lisinopril (PRINIVIL,ZESTRIL) 20 MG tablet, Take 1 tablet by mouth Daily., Disp: 90 tablet, Rfl: 3    magnesium oxide (MAG-OX) 400 MG tablet, Take 1 tablet by mouth Daily., Disp: 90 tablet, Rfl: 3    metoprolol succinate XL (TOPROL-XL) 25 MG 24 hr tablet, Take 0.5 tablets by mouth Daily., Disp: 45 tablet, Rfl: 3    Testosterone Cypionate (DEPOTESTOTERONE CYPIONATE) 200 MG/ML injection, Inject 1 mL into the appropriate muscle as directed by prescriber., Disp: , Rfl:     vitamin D3 125 MCG (5000 UT) capsule capsule, Take 1 capsule by mouth Daily., Disp: , Rfl:     lisinopril-hydrochlorothiazide (PRINZIDE,ZESTORETIC) 20-12.5 MG per tablet, Take 1 tablet by mouth Daily. (Patient not taking: Reported on 2025), Disp: , Rfl:     metFORMIN (GLUCOPHAGE) 500 MG tablet, TAKE 1 TABLET BY MOUTH AT NOON (Patient not taking: Reported on 2025), Disp: , Rfl:   No current facility-administered medications for this visit.    Social History     Socioeconomic History    Marital status:     Number of children: 2    Years of education: 12    Highest education level: High school graduate   Tobacco Use    Smoking status: Former     Current packs/day: 0.00     Types: Cigarettes     Quit date:      Years since quittin.3     Passive exposure: Past    Smokeless tobacco: Never    Tobacco comments:     Caffeine: 2 coffee daily   Vaping Use    Vaping status: Never Used   Substance and Sexual Activity    Alcohol use: Not Currently    Drug use: Never    Sexual activity: Defer       Family History   Problem Relation Age of Onset    Diabetes Mother     Hypertension Mother     Heart failure Mother     Heart disease Mother     Colon cancer Mother     Diabetes Father     Hypertension Father     Stroke Father     Hypertension Sister     Hypertension Brother     Diabetes Brother        Class 2 Severe Obesity (BMI >=35 and <=39.9). Obesity-related health conditions include the following:  osteoarthritis. Obesity is improving with lifestyle modifications. BMI is is above average; BMI management plan is completed.     Social History    Tobacco Use      Smoking status: Former        Packs/day: 0.00        Types: Cigarettes        Quit date:         Years since quittin.3        Passive exposure: Past      Smokeless tobacco: Never      Tobacco comments: Caffeine: 2 coffee daily       Review of Systems   Constitutional:  Negative for activity change, appetite change, chills, diaphoresis, fatigue, fever and unexpected weight change.   HENT:  Negative for congestion, dental problem, drooling, ear discharge, ear pain, facial swelling, hearing loss, mouth sores, nosebleeds, postnasal drip, rhinorrhea, sinus pressure, sinus pain, sneezing, sore throat, tinnitus, trouble swallowing and voice change.    Eyes:  Negative for photophobia, pain, discharge, redness, itching and visual disturbance.   Respiratory:  Negative for apnea, cough, choking, chest tightness, shortness of breath, wheezing and stridor.    Cardiovascular:  Negative for chest pain, palpitations and leg swelling.   Gastrointestinal:  Negative for abdominal distention, abdominal pain, anal bleeding, blood in stool, constipation, diarrhea, nausea, rectal pain and vomiting.   Endocrine: Negative for cold intolerance, heat intolerance, polydipsia, polyphagia and polyuria.   Genitourinary:  Negative for decreased urine volume, difficulty urinating, dysuria, enuresis, flank pain, frequency, genital sores, hematuria, penile discharge, penile pain, penile swelling, scrotal swelling, testicular pain and urgency.   Musculoskeletal:  Positive for arthralgias. Negative for back pain, gait problem, joint swelling, myalgias, neck pain and neck stiffness.        RT shoulder    Skin:  Negative for color change, pallor, rash and wound.   Allergic/Immunologic: Negative for environmental allergies, food allergies and immunocompromised state.   Neurological:   "Positive for numbness. Negative for dizziness, tremors, seizures, syncope, facial asymmetry, speech difficulty, weakness, light-headedness and headaches.   Hematological:  Negative for adenopathy. Does not bruise/bleed easily.   Psychiatric/Behavioral:  Negative for agitation, behavioral problems, confusion, decreased concentration, dysphoric mood, hallucinations, self-injury, sleep disturbance and suicidal ideas. The patient is not nervous/anxious and is not hyperactive.    All other systems reviewed and are negative.     Patient reports he had frostbite of the toes on his right foot 3 to 4 weeks ago with the big snow and ice.  He has a long history of his feet being cold.  Long history of pinpricks to the feet.  He has noticed his skin discoloration of red, white and blue.    Gait & Balance Assessment:  Risk assessment for falls. Fall precautions:  such as;   Using gait aids a cane, walker at the appropriate height at all times for ambulation or if necessary a wheelchair  Removing all area rugs and coffee tables to create a safe environment at home  Ensure clean, dry floors  Wearing supportive footwear and properly fitting clothing  Ensure bed/chair is appropriate height and patient's feet can touch the floor  Using a shower transfer bench  Using walk-in shower and having shower safety bars installed  Ensure proper lighting, minimize glare  Have nightlights operational and in use  Participation in an exercise program for gait training, balance training and strength  Avoid carrying laundry up and down steps  Ensure proper compliance and organization of medications to avoid errors   Avoid use of over the counter sedatives and alcohol consumption  Ensure easy access to call bell, glasses, TV control, telephone  Ensure glasses/hearing aids are in use or close by (on top of night table)     Physical examination:  Temperature 98.2 °F (36.8 °C), temperature source Temporal, height 182.9 cm (72\"), weight 121 kg (267 lb " 6.4 oz).  HEENT- normocephalic, atraumatic, sclera clear  Lungs-normal expansion, no wheezing  Heart-regular rate and rhythm  Extremities-positive pulses, no edema in the upper and lower extremities.  He has color changes in the toes, red-white and purple.  His feet are cold to the touch.    Neurological Exam   WDWN WM  A/A/C, speech clear, attention normal, conversant, answers questions appropriately, good historian.  Cranial nerves II through XII are intact.  Motor examination does not reveal weakness in the . upper extremity examination is 4+/5.  Mild quad weakness in the left leg.  Sensation feels like sharp tingling pain in the medial aspect of the right foot.  Gait is normal, balance is normal.   No tremors are noted.  Reflexes are intact in the lower extremities.  Hard to elicit in the upper extremities  Mann is negative. Clonus is negative.   Palpation of the left shoulder is tender, there is glenohumeral tenderness.  He has painful range of motion of the left shoulder.    Radiographic Imaging:  For my review x-rays of the cervical spine which show interbody devices at C5-6 and C6-7.        Medical Decision Making  There are no diagnoses linked to this encounter.  Mr. Vee is a 64-year-old gentleman with prior lumbar fusion and two-level cervical fusion that was done in [facility].  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  He has been having left-sided neck shoulder and arm pain for several months.  He also has left shoulder pain and has difficulties raising his shoulder above 90 degrees, he has pain with range of motion of his shoulder.  His cervical studies reveal artificial disc at C5-6 and C6-7.  He does have broad-based disc bulging at C4-5 with bilateral foraminal narrowing.  There is left facet arthropathy at C4-5 and C5-6.  Nerve study reveals a chronic left C5 radiculopathy.  The patient does have chronic  changes in his neck and degenerative disc disease however he is having more problems with his left shoulder with abduction pain and weakness.  He requires an MRI of the left shoulder.  I would like to get this completed before I proceed with a cervical myelogram or referral to pain management.  The patient and spouse are in agreement with this plan.    Any copied data from previous notes included in the (1) HPI, (2) PE, (3) MDM and/or assessment and plan has been reviewed and is accurate as of this day.    Chanell Peres, REJI    Patient Care Team:  Trino Rodriguez DO as PCP - General (Family Medicine)        Physical Exam

## 2025-05-20 ENCOUNTER — HOSPITAL ENCOUNTER (OUTPATIENT)
Dept: MRI IMAGING | Facility: HOSPITAL | Age: 65
Discharge: HOME | End: 2025-05-20
Admitting: PHYSICIAN ASSISTANT
Payer: MEDICARE

## 2025-05-20 ENCOUNTER — OFFICE VISIT (OUTPATIENT)
Dept: NEUROSURGERY | Facility: CLINIC | Age: 65
End: 2025-05-20
Payer: MEDICARE

## 2025-05-20 VITALS
WEIGHT: 268.2 LBS | DIASTOLIC BLOOD PRESSURE: 80 MMHG | HEIGHT: 72 IN | TEMPERATURE: 97.2 F | SYSTOLIC BLOOD PRESSURE: 120 MMHG | BODY MASS INDEX: 36.33 KG/M2

## 2025-05-20 DIAGNOSIS — M75.102 TEAR OF LEFT SUPRASPINATUS TENDON: ICD-10-CM

## 2025-05-20 DIAGNOSIS — M25.512 CHRONIC LEFT SHOULDER PAIN: ICD-10-CM

## 2025-05-20 DIAGNOSIS — M25.512 PAIN IN JOINT OF LEFT SHOULDER: ICD-10-CM

## 2025-05-20 DIAGNOSIS — M50.122 CERVICAL DISC DISORDER AT C5-C6 LEVEL WITH RADICULOPATHY: ICD-10-CM

## 2025-05-20 DIAGNOSIS — G89.29 CHRONIC LEFT SHOULDER PAIN: ICD-10-CM

## 2025-05-20 DIAGNOSIS — M47.812 CERVICAL SPONDYLOSIS WITHOUT MYELOPATHY: ICD-10-CM

## 2025-05-20 DIAGNOSIS — M19.012 ARTHRITIS OF LEFT ACROMIOCLAVICULAR JOINT: ICD-10-CM

## 2025-05-20 DIAGNOSIS — R29.898 WEAKNESS OF LEFT SHOULDER: ICD-10-CM

## 2025-05-20 DIAGNOSIS — M47.22 CERVICAL SPONDYLOSIS WITH RADICULOPATHY: Primary | ICD-10-CM

## 2025-05-20 DIAGNOSIS — M50.121 CERVICAL DISC DISORDER AT C4-C5 LEVEL WITH RADICULOPATHY: ICD-10-CM

## 2025-05-20 PROCEDURE — 3074F SYST BP LT 130 MM HG: CPT | Performed by: PHYSICIAN ASSISTANT

## 2025-05-20 PROCEDURE — 1159F MED LIST DOCD IN RCRD: CPT | Performed by: PHYSICIAN ASSISTANT

## 2025-05-20 PROCEDURE — 99214 OFFICE O/P EST MOD 30 MIN: CPT | Performed by: PHYSICIAN ASSISTANT

## 2025-05-20 PROCEDURE — 3079F DIAST BP 80-89 MM HG: CPT | Performed by: PHYSICIAN ASSISTANT

## 2025-05-20 PROCEDURE — 73221 MRI JOINT UPR EXTREM W/O DYE: CPT

## 2025-05-20 PROCEDURE — 1160F RVW MEDS BY RX/DR IN RCRD: CPT | Performed by: PHYSICIAN ASSISTANT

## 2025-05-20 NOTE — PROGRESS NOTES
Patient: Yossi Vee  : 1960  Chart #: [id number]    Date of Service: 2025    CC: Neck, left shoulder and left arm pain      HPI  Mr. Vee is a 64-year-old gentleman who previously had a two-level artificial disc placed at C5-6 and C6-7 in 2018 in [facility].  The neurosurgeon there also did a lumbar L4-5 fusion.  He has since moved to [place] and lives in [place] farm.  He presents to our office with left-sided neck pain radiating into the top of the shoulder and then down the arm usually just to the elbow.  He has had symptoms for over a year however his pain is gotten worse and he has weakness in his deltoids and has difficulty raising his left arm above shoulder level.  He was sent for x-rays of his cervical spine showing his interbody prosthesis at C5-6 and C6-7 and x-rays of his left shoulder showing mild glenohumeral joint degenerative changes.  He has undergone CT of the cervical spine, cervical MRI and today we are reviewing a shoulder MRI scan.,    Chronic Illnesses:    Past Medical History:   Diagnosis Date    CAD (coronary artery disease)     Cystitis cystica     DDD (degenerative disc disease), cervical     History of nephrectomy     History of vasectomy     HLD (hyperlipidemia)     HTN (hypertension)     Kidney stone     Low testosterone     S/P gastrointestinal surgery     S/P tendon repair     HIP         Past Surgical History:   Procedure Laterality Date    APPENDECTOMY      CATARACT EXTRACTION      CERVICAL DISC ARTHROPLASTY      CORONARY STENT PLACEMENT      x3    LUMBAR FUSION      SINUS SURGERY      TONSILLECTOMY      URETHRA SURGERY         Allergies   Allergen Reactions    Codeine Nausea And Vomiting         Current Outpatient Medications:     alfuzosin (UROXATRAL) 10 MG 24 hr tablet, Take 1 tablet by mouth Daily., Disp: , Rfl:     amLODIPine (NORVASC) 5 MG tablet, Take 1 tablet by mouth Every Night., Disp: 90 tablet, Rfl: 3    aspirin 81 MG EC  tablet, Take 1 tablet by mouth Daily., Disp: , Rfl:     atorvastatin (LIPITOR) 40 MG tablet, TAKE 1 TABLET BY MOUTH EVERY NIGHT, Disp: 90 tablet, Rfl: 3    lisinopril (PRINIVIL,ZESTRIL) 20 MG tablet, Take 1 tablet by mouth Daily., Disp: 90 tablet, Rfl: 3    magnesium oxide (MAG-OX) 400 MG tablet, Take 1 tablet by mouth Daily., Disp: 90 tablet, Rfl: 3    metoprolol succinate XL (TOPROL-XL) 25 MG 24 hr tablet, Take 0.5 tablets by mouth Daily., Disp: 45 tablet, Rfl: 3    Testosterone Cypionate (DEPOTESTOTERONE CYPIONATE) 200 MG/ML injection, Inject 1 mL into the appropriate muscle as directed by prescriber., Disp: , Rfl:     vitamin D3 125 MCG (5000 UT) capsule capsule, Take 1 capsule by mouth Daily., Disp: , Rfl:     Social History     Socioeconomic History    Marital status:     Number of children: 2    Years of education: 12    Highest education level: High school graduate   Tobacco Use    Smoking status: Former     Current packs/day: 0.00     Types: Cigarettes     Quit date:      Years since quittin.4     Passive exposure: Past    Smokeless tobacco: Never    Tobacco comments:     Caffeine: 2 coffee daily   Vaping Use    Vaping status: Never Used   Substance and Sexual Activity    Alcohol use: Not Currently    Drug use: Never    Sexual activity: Defer       Family History   Problem Relation Age of Onset    Diabetes Mother     Hypertension Mother     Heart failure Mother     Heart disease Mother     Colon cancer Mother     Diabetes Father     Hypertension Father     Stroke Father     Hypertension Sister     Hypertension Brother     Diabetes Brother        Class 2 Severe Obesity (BMI >=35 and <=39.9). Obesity-related health conditions include the following: osteoarthritis. Obesity is improving with lifestyle modifications. BMI is is above average; BMI management plan is completed.     Social History    Tobacco Use      Smoking status: Former        Packs/day: 0.00        Types: Cigarettes        Quit  date:         Years since quittin.4        Passive exposure: Past      Smokeless tobacco: Never      Tobacco comments: Caffeine: 2 coffee daily       Review of Systems   Constitutional:  Negative for activity change, appetite change, chills, diaphoresis, fatigue, fever and unexpected weight change.   HENT:  Negative for congestion, dental problem, drooling, ear discharge, ear pain, facial swelling, hearing loss, mouth sores, nosebleeds, postnasal drip, rhinorrhea, sinus pressure, sinus pain, sneezing, sore throat, tinnitus, trouble swallowing and voice change.    Eyes:  Negative for photophobia, pain, discharge, redness, itching and visual disturbance.   Respiratory:  Negative for apnea, cough, choking, chest tightness, shortness of breath, wheezing and stridor.    Cardiovascular:  Negative for chest pain, palpitations and leg swelling.   Gastrointestinal:  Negative for abdominal distention, abdominal pain, anal bleeding, blood in stool, constipation, diarrhea, nausea, rectal pain and vomiting.   Endocrine: Negative for cold intolerance, heat intolerance, polydipsia, polyphagia and polyuria.   Genitourinary:  Negative for decreased urine volume, difficulty urinating, dysuria, enuresis, flank pain, frequency, genital sores, hematuria, penile discharge, penile pain, penile swelling, scrotal swelling, testicular pain and urgency.   Musculoskeletal:  Positive for arthralgias (shoulder pain). Negative for back pain, gait problem, joint swelling, myalgias, neck pain and neck stiffness.        RT shoulder    Skin:  Negative for color change, pallor, rash and wound.   Allergic/Immunologic: Negative for environmental allergies, food allergies and immunocompromised state.   Neurological:  Positive for numbness. Negative for dizziness, tremors, seizures, syncope, facial asymmetry, speech difficulty, weakness, light-headedness and headaches.   Hematological:  Negative for adenopathy. Does not bruise/bleed easily.  "  Psychiatric/Behavioral:  Negative for agitation, behavioral problems, confusion, decreased concentration, dysphoric mood, hallucinations, self-injury, sleep disturbance and suicidal ideas. The patient is not nervous/anxious and is not hyperactive.    All other systems reviewed and are negative.     Patient reports he had frostbite of the toes on his right foot 3 to 4 weeks ago with the big snow and ice.  He has a long history of his feet being cold.  Long history of pinpricks to the feet.  He has noticed his skin discoloration of red, white and blue.    Gait & Balance Assessment:  Risk assessment for falls. Fall precautions:  such as;   Using gait aids a cane, walker at the appropriate height at all times for ambulation or if necessary a wheelchair  Removing all area rugs and coffee tables to create a safe environment at home  Ensure clean, dry floors  Wearing supportive footwear and properly fitting clothing  Ensure bed/chair is appropriate height and patient's feet can touch the floor  Using a shower transfer bench  Using walk-in shower and having shower safety bars installed  Ensure proper lighting, minimize glare  Have nightlights operational and in use  Participation in an exercise program for gait training, balance training and strength  Avoid carrying laundry up and down steps  Ensure proper compliance and organization of medications to avoid errors   Avoid use of over the counter sedatives and alcohol consumption  Ensure easy access to call bell, glasses, TV control, telephone  Ensure glasses/hearing aids are in use or close by (on top of night table)     Physical examination:  Blood pressure 120/80, temperature 97.2 °F (36.2 °C), temperature source Temporal, height 182.9 cm (72\"), weight 122 kg (268 lb 3.2 oz).  HEENT- normocephalic, atraumatic, sclera clear  Lungs-normal expansion, no wheezing  Heart-regular rate and rhythm  Extremities-positive pulses, no edema in the upper and lower extremities.  He " has color changes in the toes, red-white and purple.  His feet are cold to the touch.    Neurological Exam   WDWN WM  A/A/C, speech clear, attention normal, conversant, answers questions appropriately, good historian.  Cranial nerves II through XII are intact.  Motor examination does not reveal weakness in the . upper extremity examination is 4+/5.  Mild quad weakness in the left leg.  Sensation feels like sharp tingling pain in the medial aspect of the right foot.  Gait is normal, balance is normal.   No tremors are noted.  Reflexes are intact in the lower extremities.  Hard to elicit in the upper extremities  Mann is negative. Clonus is negative.   Palpation of the left shoulder is tender, there is glenohumeral tenderness.  He has painful range of motion of the left shoulder.  Mild weakness on arm abduction.    Radiographic Imaging:  For my review x-rays of the cervical spine which show interbody devices at C5-6 and C6-7.        Medical Decision Making  Diagnoses and all orders for this visit:    1. Cervical spondylosis with radiculopathy (Primary)    2. Cervical disc disorder at C5-C6 level with radiculopathy    3. Chronic left shoulder pain    4. Tear of left supraspinatus tendon    5. Arthritis of left acromioclavicular joint      Mr. Vee is a 64-year-old gentleman with prior lumbar fusion and two-level cervical fusion that was done in [facility].  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  He has been having left-sided neck, shoulder and arm pain for several months.  He also has left shoulder pain and has difficulties raising his shoulder above 90 degrees, he has pain with range of motion of his shoulder.  His cervical studies reveal artificial disc at C5-6 and C6-7.  He does have broad-based disc bulging at C4-5 with bilateral foraminal narrowing.  There is left facet arthropathy at C4-5 and C5-6.  Nerve study reveals a  chronic left C5-6 radiculopathy, primarily C5 radiculopathy.  The patient does have chronic changes in his neck and degenerative disc disease however he is having more problems with his left shoulder therefore he underwent a MRI of the shoulder which reveals several findings associated with AC joint arthritis, joint effusion, full-thickness partial width tear of the far anterior distal supraspinatus tendon, full thickness tear of the distal infraspinatus, distal supra scapularis tendinopathy, multifocal degenerative glenoid labral tearing, the long head biceps tendon appears torn at the biceps labral anchor retracted distally beyond the field-of-view.  In summation he has degenerative changes in the cervical spine which do not require any surgical intervention.  He does require referral to a shoulder specialist for evaluation and treatment modalities.    It was a pleasure providing neurosurgical evaluation.  I have asked him to follow-up with his PCP regarding referral to Ortho closer to his home.  From a neurosurgical perspective if he has any future problems I will be more than happy to see him again.    Including assessment, review of prior documentation, review and interpretation of new diagnostic studies, discussing these findings with the patient and documentation, more than 30 minutes total time was spent on this appointment.    Any copied data from previous notes included in the (1) HPI, (2) PE, (3) MDM and/or assessment and plan has been reviewed and is accurate as of this day.    Chanell Peres, REJI    Patient Care Team:  Trino Rodriguez DO as PCP - General (Family Medicine)        Physical Exam

## 2025-06-06 RX ORDER — AMLODIPINE BESYLATE 5 MG/1
5 TABLET ORAL NIGHTLY
Qty: 90 TABLET | Refills: 3 | Status: SHIPPED | OUTPATIENT
Start: 2025-06-06